# Patient Record
Sex: MALE | Race: WHITE | NOT HISPANIC OR LATINO | URBAN - METROPOLITAN AREA
[De-identification: names, ages, dates, MRNs, and addresses within clinical notes are randomized per-mention and may not be internally consistent; named-entity substitution may affect disease eponyms.]

---

## 2018-02-28 ENCOUNTER — CONSULT (OUTPATIENT)
Dept: CARDIOLOGY | Facility: CLINIC | Age: 43
End: 2018-02-28

## 2018-02-28 ENCOUNTER — HOSPITAL ENCOUNTER (OUTPATIENT)
Dept: CARDIOLOGY | Facility: HOSPITAL | Age: 43
Discharge: HOME | End: 2018-02-28
Attending: INTERNAL MEDICINE

## 2018-02-28 VITALS
HEIGHT: 70 IN | SYSTOLIC BLOOD PRESSURE: 120 MMHG | HEART RATE: 86 BPM | DIASTOLIC BLOOD PRESSURE: 78 MMHG | WEIGHT: 264.8 LBS | BODY MASS INDEX: 37.91 KG/M2

## 2018-02-28 DIAGNOSIS — I47.1 ATRIAL TACHYCARDIA (HCC): Primary | ICD-10-CM

## 2018-02-28 DIAGNOSIS — I10 ESSENTIAL HYPERTENSION: ICD-10-CM

## 2018-02-28 PROBLEM — I47.19 ATRIAL TACHYCARDIA: Status: ACTIVE | Noted: 2018-02-28

## 2018-02-28 PROCEDURE — 93000 ELECTROCARDIOGRAM COMPLETE: CPT | Performed by: INTERNAL MEDICINE

## 2018-02-28 PROCEDURE — 99203 OFFICE O/P NEW LOW 30 MIN: CPT | Performed by: INTERNAL MEDICINE

## 2018-02-28 RX ORDER — TESTOSTERONE 16.2 MG/G
GEL TRANSDERMAL
Refills: 0 | COMMUNITY
Start: 2018-01-29 | End: 2019-07-16

## 2018-02-28 RX ORDER — TADALAFIL 20 MG
TABLET ORAL
Refills: 5 | COMMUNITY
Start: 2018-01-19

## 2018-02-28 RX ORDER — LISINOPRIL 10 MG/1
TABLET ORAL
Refills: 1 | COMMUNITY
Start: 2018-02-14

## 2018-02-28 NOTE — PROGRESS NOTES
Electrophysiology Consult     Ramesh Boston  1975  [phone]      18    DATE OF ADMISSION: (Not on file)  [facility] CARDIOLOGY    Lopez Catalan MD  [address] [place]  / [place] [address]    Chief Complaint   Patient presents with   • Establish Care     Discuss Pacemaker removal      Problem List:  1. Atrial tachycardia:   A. Diagnosed at age 11   B. EPS x 6 with reported ablation x 4   C. EP study in  for ablation - inadvertent ablation of AVN requiring PPM implant with return of instrinsic rhythm within a few years   D. Recurrence of atrial tachycardia with ablation around  with no recurrence since   E. PPM near end of life in  - chose not to replace battery at that time as patient had not used pacemaker for years  2. Essential hypertension    History of Present Illness:   Patient is a 42 year old  male who presents to our office to re-establish care and discuss possibility of removing his pacemaker that he had implanted in .  He has a history of atrial tachycardia for which he has undergone about 6 EP studies and 4 ablations.  He states his ablation in  inadvertenly ablated his AV node and he required a pacemaker for a few years.  He states he regained his intrinsic rhythm back within a few years.  He states the battery  back in  and was decided not to replace the battery as he had not been using it.  He is wondering whether this can be removed and whether he can ever get an MRI.  Since his last ablation, he feels very well with no recurrence of his atrial tachycardia.  He has no other medical problems other than hypertension which is well controlled on lisinopril.  He denies any c/o CP, SOB, dizziness, LH, or syncope.  He denies any recent hospitalizations or ER visits.  He is a non-smoker, no ETOH, drinks a few cups of coffee daily.  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     Allergies   Allergen Reactions   •  Penicillins Rash        Cannot display prior to admission medications because the patient has not been admitted in this contact.            Current Outpatient Prescriptions:   •  esomeprazole (nexIUM) 20 MG capsule, Take 20 mg by mouth Every Morning Before Breakfast., Disp: , Rfl:   •  Prenatal Vit-Fe Fumarate-FA (M-VIT PO), Take  by mouth Daily., Disp: , Rfl:   •  ANDROGEL PUMP 20.25 MG/ACT (1.62%) gel, USE 2 PUMPS ONCE DAILY, Disp: , Rfl: 0  •  CIALIS 20 MG tablet, USE DAILY AS NEEDED, Disp: , Rfl: 5  •  lisinopril (PRINIVIL,ZESTRIL) 10 MG tablet, TAKE 1 TABLET BY MOUTH DAILY AS DIRECTED., Disp: , Rfl: 1    Social History     Social History   • Marital status: Unknown     Social History Main Topics   • Smoking status: Never Smoker   • Smokeless tobacco: Never Used   • Alcohol use No   • Drug use: No   • Sexual activity: Defer       Family History   Problem Relation Age of Onset   • No Known Problems Mother    • No Known Problems Father    • No Known Problems Sister        REVIEW OF SYSTEMS:   CONST:  No weight loss, fever, chills, weakness or fatigue.   HEENT:  No visual loss, blurred vision, double vision, yellow sclerae.                   No hearing loss, congestion, sore throat.   SKIN:      No rashes, urticaria, ulcers, sores.     RESP:     No shortness of breath, hemoptysis, cough, sputum.   GI:           No anorexia, nausea, vomiting, diarrhea. No abdominal pain, melena.   :         No burning on urination, hematuria or increased frequency.  ENDO:    No diaphoresis, cold or heat intolerance. No polyuria or polydipsia.   NEURO:  No headache, dizziness, syncope, paralysis, ataxia, or parasthesias.                  No change in bowel or bladder control. No history of CVA/TIA  MUSC:    No muscle, back pain, joint pain or stiffness.   HEME:    No anemia, bleeding, bruising. No history of DVT/PE.  PSYCH:  No history of depression, anxiety    Vitals:    02/28/18 1044   BP: 120/78   BP Location: Right arm  "  Patient Position: Sitting   Pulse: 86   Weight: 120 kg (264 lb 12.8 oz)   Height: 177.8 cm (70\")                 Physical Exam:  GEN: Well nourished, Well- developed  No acute distress  HEENT: Normocephalic, Atraumatic, PERRLA, moist mucous membranes  NECK: supple, NO JVD, no thyromegaly, no lymphadenopathy  CARD: S1S2, RRR no murmur, gallop, rub  LUNGS: Clear to auscultation, normal respiratory effort  ABDOMEN: Soft, nontender, normal bowel sounds  EXTREMITIES:No gross deformities,  No clubbing, cyanosis, or edema  SKIN: Warm, dry  NEURO: No focal deficits  PSYCHIATRIC: Normal affect and mood        ECG 12 Lead  Date/Time: 2/28/2018 11:15 AM  Performed by: JACQUELIN LACEY  Authorized by: JACQUELIN LACEY   Comparison: not compared with previous ECG   Previous ECG: no previous ECG available  Rhythm: sinus rhythm  BPM: 86                ICD-10-CM ICD-9-CM   1. Atrial tachycardia I47.1 427.89   2. Essential hypertension I10 401.9       Assessment and Plan:   1. Atrial tachycardia:  - S/p multiple EP studies and ablations, necessitated pacemaker implant in 1995, has not used since around 1998 with KRISTI around 2004  - Will obtain echocardiogram to assess EF and tricuspid valve, otherwise, no other changes at this time.  No indication or need for pacemaker removal, discussed this with patient and he verbalizes understanding.  He is aware he is able to get an MRI anytime if he needs.    2. Essential hypertension:  - Well controlled on current regimen  - Continue diet, weight loss and exercise    RTC 12 M    I, Jacquelin Lacey MD, personally performed the services described in this documentation as scribed by the above named individual in my presence, and it is both accurate and complete.  2/28/2018  11:50 AM  Scribed for Jacquelin Lacey MD by Sandy Chavez, APRN. 2/28/2018  11:33 AM  "

## 2019-07-15 NOTE — PROGRESS NOTES
[facility] at [facility]   OFFICE NOTE      Ramesh Boston  1975  PCP: Randi Hong MD    SUBJECTIVE:   Ramesh Boston is a 43 y.o. male seen for a follow up visit regarding the following:     CC:Atrial Tachycardia     HPI:   Mr. Boston is a 43-year-old gentleman who presents today for follow-up regarding history of SVT, atrial tachycardia, remote pacemaker placement for intermittent heart block.  Mr. Boston at age 11 had episodes of SVT with multiple ablation procedures.  In 1995 he had ablation with inadvertent ablation of AV node leading to heart block which required a pacemaker plantation.  However the AV node function recovered he no further required pacing in 2004 when his device had reached KRISTI it was elected not to replace the pacemaker if he had no further events of heart block.  In addition he had a repeat EP study over 20 years ago with successful ablation of SVT.  He has had no recurrent episodes of tachypalpitations.  He denies any dizziness near syncope or syncope.  He denies any chest pain or chest tightness suggesting angina pectoris.  He denies any heart failure symptoms.  He states that he takes his Zestril and his blood pressure remains controlled.  He checks his cholesterol with his family physician.  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  Because of his cardiac history he went to establish care with her office and continue following up with Dr. Lacey.    Cardiac PMH: (Old records have been reviewed and summarized below)  1. Atrial tachycardia:              A. Diagnosed at age 11              B. EPS x 6 with reported ablation x 4              C. EP study in 1995 for ablation - inadvertent ablation of AVN requiring PPM implant with return of instrinsic rhythm within a few years              D. Recurrence of atrial tachycardia with ablation around 2000 with no recurrence since              E. PPM near end of life in  2004 - chose not to replace battery at that time as patient had not used pacemaker for years  2. Essential hypertension  3. Mild Obesity.         Past Medical History, Past Surgical History, Family history, Social History, and Medications were all reviewed with the patient today and updated as necessary.       Current Outpatient Medications:   •  Cetirizine HCl (ZYRTEC PO), Take 1 tablet by mouth Daily., Disp: , Rfl:   •  CIALIS 20 MG tablet, USE DAILY AS NEEDED, Disp: , Rfl: 5  •  esomeprazole (nexIUM) 20 MG capsule, Take 20 mg by mouth Every Morning Before Breakfast., Disp: , Rfl:   •  lisinopril (PRINIVIL,ZESTRIL) 10 MG tablet, TAKE 1 TABLET BY MOUTH DAILY AS DIRECTED., Disp: , Rfl: 1  •  Montelukast Sodium (SINGULAIR PO), Take 1 tablet by mouth Daily., Disp: , Rfl:       Allergies   Allergen Reactions   • Penicillins Rash     Patient Active Problem List   Diagnosis   • Essential hypertension   • Atrial tachycardia (CMS/HCC)     Past Medical History:   Diagnosis Date   • Abnormal heart rhythm    • H/O cardiovascular stress test    • H/O echocardiogram    • Hypertension      Past Surgical History:   Procedure Laterality Date   • ABLATION OF DYSRHYTHMIC FOCUS     • EP STUDY     • INSERT / REPLACE / REMOVE PACEMAKER       Family History   Problem Relation Age of Onset   • No Known Problems Mother    • No Known Problems Father    • No Known Problems Sister      Social History     Tobacco Use   • Smoking status: Never Smoker   • Smokeless tobacco: Never Used   Substance Use Topics   • Alcohol use: No       ROS:  Review of Symptoms:  General: no recent weight loss/gain, weakness or fatigue  Skin: no rashes, lumps, or other skin changes  HEENT: no dizziness, lightheadedness, or vision changes  Respiratory: no cough or hemoptysis  Cardiovascular: no palpitations, and tachycardia.   Gastrointestinal: no black/tarry stools or diarrhea. + GERD.   Urinary: no change in frequency or urgency. Hx of ED.   Peripheral Vascular:  "no claudication or leg cramps  Musculoskeletal: + OA  Psychiatric: no depression or excessive stress  Neurological: no sensory or motor loss, no syncope  Hematologic: no anemia, easy bruising or bleeding  Endocrine: no thyroid problems, nor heat or cold intolerance    PHYSICAL EXAM:    /80 (BP Location: Left arm, Patient Position: Sitting)   Pulse 85   Ht 175.3 cm (69\")   Wt 117 kg (258 lb)   SpO2 98%   BMI 38.10 kg/m²        Wt Readings from Last 5 Encounters:   07/16/19 117 kg (258 lb)   02/28/18 120 kg (264 lb 12.8 oz)       BP Readings from Last 5 Encounters:   07/16/19 130/80   02/28/18 120/78       General appearance - Alert, well appearing, and in no distress   Mental status - Affect appropriate to mood.  Eyes - Sclerae anicteric,  ENMT - Hearing grossly normal bilaterally, Dental hygiene good.  Neck - Carotids upstroke normal bilaterally, no bruits, no JVD.  Resp - Clear to auscultation, no wheezes, rales or rhonchi, symmetric air entry.  Heart - Normal rate, regular rhythm, normal S1, S2, no murmurs, rubs, clicks or gallops.  GI - Soft, nontender, nondistended, no masses or organomegaly.  Neurological - Grossly intact - normal speech, no focal findings  Musculoskeletal - No joint tenderness, deformity or swelling, no muscular tenderness noted.  Extremities - Peripheral pulses normal, no pedal edema, no clubbing or cyanosis.  Skin - Normal coloration and turgor.  Psych -  oriented to person, place, and time.    Medical problems and test results were reviewed with the patient today.     No results found for this or any previous visit (from the past 672 hour(s)).      EKG: (EKG has been independently visualized by me and summarized below)    ECG 12 Lead  Date/Time: 7/16/2019 11:48 AM  Performed by: Moses Hayward PA  Authorized by: Moses Hayward PA   Comparison: compared with previous ECG from 2/28/2018  Similar to previous ECG  Rhythm: sinus rhythm  Rate: normal  Conduction: conduction " normal  ST Segments: ST segments normal  T Waves: T waves normal  QRS axis: normal    Clinical impression: normal ECG            ASSESSMENT   1. Atrial Tachycardia: Remote EPS/RFA. No recurrent palpitations, dizziness, or syncope.   2. HTN:  Controlled on Zestril   3. Remote PPM, The patient no longer requires pacing.  No history of near syncope or syncope.      PLAN  · Patient continues to do well with no episodes of palpitations or recurrent SVT.  He said no recurrent episodes dizzy near syncope or syncope or doctor-patient recurrent heart block.  He is no longer requiring use of a pacemaker.  · Continue to focus on risk factor medication including strict control blood pressure, diet exercise weight loss.  · Echocardiogram to reveiw EF and GXT stress test for history of arrythmias, CDL clearance.   · Return to follow-up after [facility] 1 year or sooner as needed.    7/16/2019  10:38 AM    Will Yesy ANDRADE

## 2019-07-16 ENCOUNTER — OFFICE VISIT (OUTPATIENT)
Dept: CARDIOLOGY | Facility: CLINIC | Age: 44
End: 2019-07-16

## 2019-07-16 VITALS
WEIGHT: 258 LBS | HEART RATE: 85 BPM | HEIGHT: 69 IN | OXYGEN SATURATION: 98 % | SYSTOLIC BLOOD PRESSURE: 130 MMHG | DIASTOLIC BLOOD PRESSURE: 80 MMHG | BODY MASS INDEX: 38.21 KG/M2

## 2019-07-16 DIAGNOSIS — I47.1 ATRIAL TACHYCARDIA (HCC): Primary | ICD-10-CM

## 2019-07-16 DIAGNOSIS — I10 ESSENTIAL HYPERTENSION: ICD-10-CM

## 2019-07-16 PROCEDURE — 93000 ELECTROCARDIOGRAM COMPLETE: CPT | Performed by: PHYSICIAN ASSISTANT

## 2019-07-16 PROCEDURE — 99213 OFFICE O/P EST LOW 20 MIN: CPT | Performed by: PHYSICIAN ASSISTANT

## 2019-07-17 ENCOUNTER — TELEPHONE (OUTPATIENT)
Dept: CARDIOLOGY | Facility: CLINIC | Age: 44
End: 2019-07-17

## 2020-08-16 ENCOUNTER — HOSPITAL ENCOUNTER (EMERGENCY)
Facility: HOSPITAL | Age: 45
Discharge: HOME | End: 2020-08-16
Attending: EMERGENCY MEDICINE | Admitting: EMERGENCY MEDICINE

## 2020-08-16 ENCOUNTER — APPOINTMENT (OUTPATIENT)
Dept: CT IMAGING | Facility: HOSPITAL | Age: 45
End: 2020-08-16

## 2020-08-16 VITALS
SYSTOLIC BLOOD PRESSURE: 114 MMHG | DIASTOLIC BLOOD PRESSURE: 76 MMHG | RESPIRATION RATE: 16 BRPM | TEMPERATURE: 97.8 F | HEIGHT: 69 IN | BODY MASS INDEX: 36.26 KG/M2 | HEART RATE: 75 BPM | WEIGHT: 244.8 LBS | OXYGEN SATURATION: 95 %

## 2020-08-16 DIAGNOSIS — N20.0 KIDNEY STONE: Primary | ICD-10-CM

## 2020-08-16 LAB
ALBUMIN SERPL-MCNC: 4.4 G/DL (ref 3.5–5.2)
ALBUMIN/GLOB SERPL: 1.4 G/DL
ALP SERPL-CCNC: 50 U/L (ref 39–117)
ALT SERPL W P-5'-P-CCNC: 33 U/L (ref 1–41)
ANION GAP SERPL CALCULATED.3IONS-SCNC: 9.5 MMOL/L (ref 5–15)
AST SERPL-CCNC: 22 U/L (ref 1–40)
BACTERIA UR QL AUTO: ABNORMAL /HPF
BASOPHILS # BLD AUTO: 0.06 10*3/MM3 (ref 0–0.2)
BASOPHILS NFR BLD AUTO: 0.6 % (ref 0–1.5)
BILIRUB SERPL-MCNC: 0.4 MG/DL (ref 0–1.2)
BILIRUB UR QL STRIP: NEGATIVE
BUN SERPL-MCNC: 24 MG/DL (ref 6–20)
BUN/CREAT SERPL: 18.2 (ref 7–25)
CALCIUM SPEC-SCNC: 9.4 MG/DL (ref 8.6–10.5)
CHLORIDE SERPL-SCNC: 102 MMOL/L (ref 98–107)
CLARITY UR: CLEAR
CO2 SERPL-SCNC: 27.5 MMOL/L (ref 22–29)
COLOR UR: YELLOW
CREAT SERPL-MCNC: 1.32 MG/DL (ref 0.76–1.27)
DEPRECATED RDW RBC AUTO: 42.1 FL (ref 37–54)
EOSINOPHIL # BLD AUTO: 0.23 10*3/MM3 (ref 0–0.4)
EOSINOPHIL NFR BLD AUTO: 2.2 % (ref 0.3–6.2)
ERYTHROCYTE [DISTWIDTH] IN BLOOD BY AUTOMATED COUNT: 13 % (ref 12.3–15.4)
GFR SERPL CREATININE-BSD FRML MDRD: 58 ML/MIN/1.73
GLOBULIN UR ELPH-MCNC: 3.1 GM/DL
GLUCOSE SERPL-MCNC: 118 MG/DL (ref 65–99)
GLUCOSE UR STRIP-MCNC: NEGATIVE MG/DL
HCT VFR BLD AUTO: 54.9 % (ref 37.5–51)
HGB BLD-MCNC: 18.8 G/DL (ref 13–17.7)
HGB UR QL STRIP.AUTO: ABNORMAL
HOLD SPECIMEN: NORMAL
HOLD SPECIMEN: NORMAL
HYALINE CASTS UR QL AUTO: ABNORMAL /LPF
IMM GRANULOCYTES # BLD AUTO: 0.06 10*3/MM3 (ref 0–0.05)
IMM GRANULOCYTES NFR BLD AUTO: 0.6 % (ref 0–0.5)
KETONES UR QL STRIP: NEGATIVE
LEUKOCYTE ESTERASE UR QL STRIP.AUTO: NEGATIVE
LIPASE SERPL-CCNC: 38 U/L (ref 13–60)
LYMPHOCYTES # BLD AUTO: 4.32 10*3/MM3 (ref 0.7–3.1)
LYMPHOCYTES NFR BLD AUTO: 41.7 % (ref 19.6–45.3)
MCH RBC QN AUTO: 30.6 PG (ref 26.6–33)
MCHC RBC AUTO-ENTMCNC: 34.2 G/DL (ref 31.5–35.7)
MCV RBC AUTO: 89.4 FL (ref 79–97)
MONOCYTES # BLD AUTO: 0.65 10*3/MM3 (ref 0.1–0.9)
MONOCYTES NFR BLD AUTO: 6.3 % (ref 5–12)
NEUTROPHILS NFR BLD AUTO: 48.6 % (ref 42.7–76)
NEUTROPHILS NFR BLD AUTO: 5.04 10*3/MM3 (ref 1.7–7)
NITRITE UR QL STRIP: NEGATIVE
NRBC BLD AUTO-RTO: 0 /100 WBC (ref 0–0.2)
PH UR STRIP.AUTO: <=5 [PH] (ref 5–8)
PLATELET # BLD AUTO: 227 10*3/MM3 (ref 140–450)
PMV BLD AUTO: 10 FL (ref 6–12)
POTASSIUM SERPL-SCNC: 3.8 MMOL/L (ref 3.5–5.2)
PROT SERPL-MCNC: 7.5 G/DL (ref 6–8.5)
PROT UR QL STRIP: NEGATIVE
RBC # BLD AUTO: 6.14 10*6/MM3 (ref 4.14–5.8)
RBC # UR: ABNORMAL /HPF
REF LAB TEST METHOD: ABNORMAL
SODIUM SERPL-SCNC: 139 MMOL/L (ref 136–145)
SP GR UR STRIP: 1.02 (ref 1–1.03)
SQUAMOUS #/AREA URNS HPF: ABNORMAL /HPF
UROBILINOGEN UR QL STRIP: ABNORMAL
WBC # BLD AUTO: 10.36 10*3/MM3 (ref 3.4–10.8)
WBC UR QL AUTO: ABNORMAL /HPF
WHOLE BLOOD HOLD SPECIMEN: NORMAL
WHOLE BLOOD HOLD SPECIMEN: NORMAL
YEAST URNS QL MICRO: ABNORMAL /HPF

## 2020-08-16 PROCEDURE — 96376 TX/PRO/DX INJ SAME DRUG ADON: CPT

## 2020-08-16 PROCEDURE — 96374 THER/PROPH/DIAG INJ IV PUSH: CPT

## 2020-08-16 PROCEDURE — 74176 CT ABD & PELVIS W/O CONTRAST: CPT

## 2020-08-16 PROCEDURE — 99283 EMERGENCY DEPT VISIT LOW MDM: CPT

## 2020-08-16 PROCEDURE — 25010000002 MORPHINE PER 10 MG: Performed by: EMERGENCY MEDICINE

## 2020-08-16 PROCEDURE — 96375 TX/PRO/DX INJ NEW DRUG ADDON: CPT

## 2020-08-16 PROCEDURE — 25010000002 KETOROLAC TROMETHAMINE PER 15 MG: Performed by: EMERGENCY MEDICINE

## 2020-08-16 PROCEDURE — 85025 COMPLETE CBC W/AUTO DIFF WBC: CPT | Performed by: EMERGENCY MEDICINE

## 2020-08-16 PROCEDURE — 80053 COMPREHEN METABOLIC PANEL: CPT | Performed by: EMERGENCY MEDICINE

## 2020-08-16 PROCEDURE — 81001 URINALYSIS AUTO W/SCOPE: CPT | Performed by: EMERGENCY MEDICINE

## 2020-08-16 PROCEDURE — 25010000002 ONDANSETRON PER 1 MG: Performed by: EMERGENCY MEDICINE

## 2020-08-16 PROCEDURE — 83690 ASSAY OF LIPASE: CPT | Performed by: EMERGENCY MEDICINE

## 2020-08-16 RX ORDER — ONDANSETRON 4 MG/1
4 TABLET, ORALLY DISINTEGRATING ORAL 4 TIMES DAILY PRN
Qty: 20 TABLET | Refills: 0 | Status: SHIPPED | OUTPATIENT
Start: 2020-08-16 | End: 2022-01-12

## 2020-08-16 RX ORDER — KETOROLAC TROMETHAMINE 30 MG/ML
10 INJECTION, SOLUTION INTRAMUSCULAR; INTRAVENOUS ONCE
Status: COMPLETED | OUTPATIENT
Start: 2020-08-16 | End: 2020-08-16

## 2020-08-16 RX ORDER — HYDROCODONE BITARTRATE AND ACETAMINOPHEN 5; 325 MG/1; MG/1
1 TABLET ORAL EVERY 6 HOURS PRN
Qty: 10 TABLET | Refills: 0 | Status: SHIPPED | OUTPATIENT
Start: 2020-08-16 | End: 2020-12-10

## 2020-08-16 RX ORDER — MORPHINE SULFATE 4 MG/ML
4 INJECTION, SOLUTION INTRAMUSCULAR; INTRAVENOUS ONCE
Status: COMPLETED | OUTPATIENT
Start: 2020-08-16 | End: 2020-08-16

## 2020-08-16 RX ORDER — ONDANSETRON 2 MG/ML
4 INJECTION INTRAMUSCULAR; INTRAVENOUS ONCE
Status: COMPLETED | OUTPATIENT
Start: 2020-08-16 | End: 2020-08-16

## 2020-08-16 RX ORDER — TAMSULOSIN HYDROCHLORIDE 0.4 MG/1
1 CAPSULE ORAL DAILY
Qty: 30 CAPSULE | Refills: 0 | Status: SHIPPED | OUTPATIENT
Start: 2020-08-16 | End: 2020-12-10

## 2020-08-16 RX ADMIN — KETOROLAC TROMETHAMINE 10 MG: 30 INJECTION, SOLUTION INTRAMUSCULAR at 06:14

## 2020-08-16 RX ADMIN — MORPHINE SULFATE 4 MG: 4 INJECTION, SOLUTION INTRAMUSCULAR; INTRAVENOUS at 06:14

## 2020-08-16 RX ADMIN — SODIUM CHLORIDE 1000 ML: 9 INJECTION, SOLUTION INTRAVENOUS at 06:11

## 2020-08-16 RX ADMIN — LIDOCAINE HYDROCHLORIDE 150 MG: 10 INJECTION, SOLUTION INFILTRATION; PERINEURAL at 07:56

## 2020-08-16 RX ADMIN — ONDANSETRON 4 MG: 2 INJECTION INTRAMUSCULAR; INTRAVENOUS at 06:11

## 2020-08-16 RX ADMIN — KETOROLAC TROMETHAMINE 10 MG: 30 INJECTION, SOLUTION INTRAMUSCULAR; INTRAVENOUS at 07:28

## 2020-08-16 RX ADMIN — MORPHINE SULFATE 4 MG: 4 INJECTION, SOLUTION INTRAMUSCULAR; INTRAVENOUS at 07:28

## 2020-08-16 NOTE — ED PROVIDER NOTES
TRIAGE CHIEF COMPLAINT:     Nursing and triage notes reviewed    Chief Complaint   Patient presents with   • Flank Pain      HPI: Ramesh Boston is a 44 y.o. male who presents to the emergency department complaining of a 1 hour history of right-sided flank pain.  Patient describes a sharp stabbing pain.  He has had some nausea but denies vomiting.  He denies dysuria or hematuria.  He denies diarrhea.  No fevers or chills.  No chest pain or shortness of breath.  Patient denies ever having similar symptoms in the past.  He states he was told before that he had a stone in 1 of his kidneys but is never had any symptoms of passing a stone previously.    REVIEW OF SYSTEMS: All other systems reviewed and are negative     PAST MEDICAL HISTORY:   Past Medical History:   Diagnosis Date   • Abnormal heart rhythm    • H/O cardiovascular stress test    • H/O echocardiogram    • Hypertension         FAMILY HISTORY:   Family History   Problem Relation Age of Onset   • No Known Problems Mother    • No Known Problems Father    • No Known Problems Sister         SOCIAL HISTORY:   Social History     Socioeconomic History   • Marital status:      Spouse name: Not on file   • Number of children: Not on file   • Years of education: Not on file   • Highest education level: Not on file   Tobacco Use   • Smoking status: Never Smoker   • Smokeless tobacco: Never Used   Substance and Sexual Activity   • Alcohol use: No   • Drug use: No   • Sexual activity: Defer        SURGICAL HISTORY:   Past Surgical History:   Procedure Laterality Date   • ABLATION OF DYSRHYTHMIC FOCUS     • EP STUDY     • INSERT / REPLACE / REMOVE PACEMAKER          CURRENT MEDICATIONS:      Medication List      ASK your doctor about these medications    Cialis 20 MG tablet  Generic drug:  tadalafil     esomeprazole 20 MG capsule  Commonly known as:  nexIUM     lisinopril 10 MG tablet  Commonly known as:  PRINIVIL,ZESTRIL     SINGULAIR PO     ZYRTEC PO            ALLERGIES: Penicillins     PHYSICAL EXAM:   VITAL SIGNS:   Vitals:    08/16/20 0541   BP: 126/82   Pulse: 74   Resp: 18   Temp: 97.8 °F (36.6 °C)   SpO2: 96%      CONSTITUTIONAL: Awake, oriented, appears non-toxic   HENT: Atraumatic, normocephalic, oral mucosa pink and moist, airway patent. Nares patent without drainage. External ears normal.   EYES: Conjunctiva clear   NECK: Trachea midline, non-tender, supple   CARDIOVASCULAR: Normal heart rate, Normal rhythm, No murmurs, rubs, gallops   PULMONARY/CHEST: Clear to auscultation, no rhonchi, wheezes, or rales. Symmetrical breath sounds.  ABDOMINAL: Non-distended, soft, mild right flank tenderness- no rebound or guarding.  NEUROLOGIC: Non-focal, moving all four extremities, no gross sensory or motor deficits.   EXTREMITIES: No clubbing, cyanosis, or edema   SKIN: Warm, Dry, No erythema, No rash     ED COURSE / MEDICAL DECISION MAKING:   Ramesh Boston is a 44 y.o. male who presents to the emergency department for evaluation of right-sided flank pain.  Patient appears mildly uncomfortable on arrival in the emergency department but vital signs are stable and patient appears nontoxic.  Examination does reveal tenderness in the right flank.  Exam otherwise is largely unremarkable.  Will obtain laboratory tests, urinalysis, imaging for further evaluation of patient's symptoms.    Laboratory tests reveal a creatinine of 1.3.  Labs otherwise largely unremarkable aside from blood in patient's urine.  CT scan per my interpretation reveals a right-sided distal ureteral stone.  Will await official radiology interpretation.    DECISION TO DISCHARGE/ADMIT: see ED care timeline     FINAL IMPRESSION:   1 --kidney stone  2 --   3 --     Electronically signed by: Lois Sanchez MD, 8/16/2020 06:04       Lois Sanchez MD  08/16/20 0646

## 2020-08-16 NOTE — ED NOTES
Patient was provided urinal, strainer, and specimen cup upon discharge.      Magda Peña RN  08/16/20 7038

## 2020-12-10 ENCOUNTER — OFFICE VISIT (OUTPATIENT)
Dept: CARDIOLOGY | Facility: CLINIC | Age: 45
End: 2020-12-10

## 2020-12-10 VITALS
WEIGHT: 249 LBS | HEART RATE: 72 BPM | DIASTOLIC BLOOD PRESSURE: 76 MMHG | SYSTOLIC BLOOD PRESSURE: 124 MMHG | OXYGEN SATURATION: 98 % | TEMPERATURE: 97.7 F | HEIGHT: 70 IN | BODY MASS INDEX: 35.65 KG/M2

## 2020-12-10 DIAGNOSIS — I47.1 PAROXYSMAL SVT (SUPRAVENTRICULAR TACHYCARDIA) (HCC): Primary | ICD-10-CM

## 2020-12-10 PROCEDURE — 93000 ELECTROCARDIOGRAM COMPLETE: CPT | Performed by: PHYSICIAN ASSISTANT

## 2020-12-10 PROCEDURE — 99213 OFFICE O/P EST LOW 20 MIN: CPT | Performed by: PHYSICIAN ASSISTANT

## 2020-12-10 RX ORDER — FAMOTIDINE 20 MG/1
20 TABLET, FILM COATED ORAL DAILY
COMMUNITY

## 2020-12-10 NOTE — PROGRESS NOTES
[facility] at [facility]   OFFICE NOTE      Ramesh Boston  1975  PCP: Radni Hong MD    SUBJECTIVE:   Ramesh Boston is a 45 y.o. male seen for a follow up visit regarding the following:     CC:AT    [facility]:   45-year-old gent returns today follow-up regarding atrial tachycardia previous history of pacemaker and hypertension.  The patient reports since last office visit things are going well for him.  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  He has been very active with his job is actually lost weight.  He reports his blood pressure well controlled.  He has had no difficulty with palpitations, dizziness, near-syncope, or syncope events.  He denies any heart failure symptoms.  He is wearing a CPAP and he states is improved his energy greatly.    Cardiac PMH: (Old records have been reviewed and summarized below)  1. Atrial tachycardia:              A. Diagnosed at age 11              B. EPS x 6 with reported ablation x 4              C. EP study in 1995 for ablation - inadvertent ablation of AVN requiring PPM implant with return of instrinsic rhythm within a few years              D. Recurrence of atrial tachycardia with ablation around 2000 with no recurrence since              E. PPM near end of life in 2004 - chose not to replace battery at that time as patient had not used pacemaker for years  2. Essential hypertension  3. Mild Obesity.   4. ALYSON:CPAP    Past Medical History, Past Surgical History, Family history, Social History, and Medications were all reviewed with the patient today and updated as necessary.       Current Outpatient Medications:   •  Cetirizine HCl (ZYRTEC PO), Take 1 tablet by mouth Daily., Disp: , Rfl:   •  CIALIS 20 MG tablet, USE DAILY AS NEEDED, Disp: , Rfl: 5  •  famotidine (PEPCID) 20 MG tablet, Take 20 mg by mouth Daily., Disp: , Rfl:   •  lisinopril (PRINIVIL,ZESTRIL) 10 MG tablet, TAKE 1 TABLET BY MOUTH DAILY AS DIRECTED., Disp: , Rfl: 1  •   "Montelukast Sodium (SINGULAIR PO), Take 1 tablet by mouth Daily., Disp: , Rfl:   •  ondansetron ODT (ZOFRAN-ODT) 4 MG disintegrating tablet, Place 1 tablet on the tongue 4 (Four) Times a Day As Needed for Nausea., Disp: 20 tablet, Rfl: 0  •  Unable to find, Med Name: CPAP QHS, Disp: , Rfl:       Allergies   Allergen Reactions   • Penicillins Anaphylaxis     Patient Active Problem List   Diagnosis   • Essential hypertension   • Atrial tachycardia (CMS/HCC)     Past Medical History:   Diagnosis Date   • Abnormal heart rhythm    • H/O cardiovascular stress test    • H/O echocardiogram    • Hypertension      Past Surgical History:   Procedure Laterality Date   • ABLATION OF DYSRHYTHMIC FOCUS     • EP STUDY     • INSERT / REPLACE / REMOVE PACEMAKER       Family History   Problem Relation Age of Onset   • No Known Problems Mother    • No Known Problems Father    • No Known Problems Sister      Social History     Tobacco Use   • Smoking status: Never Smoker   • Smokeless tobacco: Never Used   Substance Use Topics   • Alcohol use: No       ROS:  Review of Symptoms:  General: no recent weight loss/gain, weakness or fatigue  Skin: no rashes, lumps, or other skin changes  HEENT: no dizziness, lightheadedness, or vision changes  Respiratory: no cough or hemoptysis  Cardiovascular: no palpitations, and tachycardia  Gastrointestinal: no black/tarry stools or diarrhea  Urinary: no change in frequency or urgency  Peripheral Vascular: no claudication or leg cramps  Musculoskeletal: no muscle or joint pain/stiffness  Psychiatric: no depression or excessive stress  Neurological: no sensory or motor loss, no syncope  Hematologic: no anemia, easy bruising or bleeding  Endocrine: no thyroid problems, nor heat or cold intolerance    PHYSICAL EXAM:    /76 (BP Location: Right arm, Patient Position: Sitting)   Pulse 72   Temp 97.7 °F (36.5 °C)   Ht 177.8 cm (70\")   Wt 113 kg (249 lb)   SpO2 98%   BMI 35.73 kg/m²        Wt " Readings from Last 5 Encounters:   12/10/20 113 kg (249 lb)   08/16/20 111 kg (244 lb 12.8 oz)   07/16/19 117 kg (258 lb)   02/28/18 120 kg (264 lb 12.8 oz)       BP Readings from Last 5 Encounters:   12/10/20 124/76   08/16/20 114/76   07/16/19 130/80   02/28/18 120/78       General appearance - Alert, well appearing, and in no distress   Mental status - Affect appropriate to mood.  Eyes - Sclerae anicteric,  ENMT - Hearing grossly normal bilaterally, Dental hygiene good.  Neck - Carotids upstroke normal bilaterally, no bruits, no JVD.  Resp - Clear to auscultation, no wheezes, rales or rhonchi, symmetric air entry.  Heart - Normal rate, regular rhythm, normal S1, S2, no murmurs, rubs, clicks or gallops.  GI - Soft, nontender, nondistended, no masses or organomegaly.  Neurological - Grossly intact - normal speech, no focal findings  Musculoskeletal - No joint tenderness, deformity or swelling, no muscular tenderness noted.  Extremities - Peripheral pulses normal, no pedal edema, no clubbing or cyanosis.  Skin - Normal coloration and turgor.  Psych -  oriented to person, place, and time.    Medical problems and test results were reviewed with the patient today.     No results found for this or any previous visit (from the past 672 hour(s)).      EKG: (EKG has been independently visualized by me and summarized below)    ECG 12 Lead    Date/Time: 12/10/2020 3:30 PM  Performed by: Moses Hayward PA  Authorized by: Moses Hayward PA   Comparison: not compared with previous ECG   Rhythm: sinus rhythm  Rate: normal  Conduction: conduction normal  ST Segments: ST segments normal  T Waves: T waves normal  QRS axis: normal  Other: no other findings    Clinical impression: normal ECG              ASSESSMENT   1.  Atrial tachycardia: Remote EP study and RFA.  No recurrent events of palpitations.  2.  Hypertension: Controlled on Zestril therapy continue diet exercise weight loss  3.  Remote perm pacemaker for unknown  reasons.  Patient has had no recurrent symptoms of dizziness or near syncope   4.  Obstructive sleep apnea: Continue to wear CPAP    PLAN  · Overall stable course no recurrent palpitations or SVT.  · Continue to focus on risk factor medication clean diet exercise weight loss and keep blood pressure well controlled and cholesterol controlled  · Patient return for follow-up in 1 year or sooner if any change in symptoms.            12/10/2020  13:39 EST    Will Yesy ANDRADE

## 2022-01-12 ENCOUNTER — OFFICE VISIT (OUTPATIENT)
Dept: CARDIOLOGY | Facility: CLINIC | Age: 47
End: 2022-01-12

## 2022-01-12 VITALS
WEIGHT: 253 LBS | OXYGEN SATURATION: 98 % | HEART RATE: 82 BPM | DIASTOLIC BLOOD PRESSURE: 80 MMHG | BODY MASS INDEX: 36.22 KG/M2 | SYSTOLIC BLOOD PRESSURE: 124 MMHG | HEIGHT: 70 IN

## 2022-01-12 DIAGNOSIS — I10 ESSENTIAL HYPERTENSION: ICD-10-CM

## 2022-01-12 DIAGNOSIS — G47.33 OSA (OBSTRUCTIVE SLEEP APNEA): ICD-10-CM

## 2022-01-12 DIAGNOSIS — I47.1 ATRIAL TACHYCARDIA: Primary | ICD-10-CM

## 2022-01-12 PROCEDURE — 99213 OFFICE O/P EST LOW 20 MIN: CPT | Performed by: INTERNAL MEDICINE

## 2022-01-12 PROCEDURE — 93000 ELECTROCARDIOGRAM COMPLETE: CPT | Performed by: INTERNAL MEDICINE

## 2022-01-12 RX ORDER — DIPHENOXYLATE HYDROCHLORIDE AND ATROPINE SULFATE 2.5; .025 MG/1; MG/1
2 TABLET ORAL DAILY
COMMUNITY

## 2022-01-12 RX ORDER — DUPILUMAB 200 MG/1.14ML
1.14 INJECTION, SOLUTION SUBCUTANEOUS
COMMUNITY

## 2022-01-12 NOTE — PROGRESS NOTES
Ramesh Boston  1975  [phone]    01/12/2022    [facility] CARDIOLOGY     Referring Provider: No ref. provider found     Randi Hong MD  [address] [facility] [address]    Chief Complaint   Patient presents with   • supraventicular tachycardia       Problem List:   1. Atrial tachycardia:              A. Diagnosed at age 11              B. EPS x 6 with reported ablation x 4              C. EP study in 1995 for ablation - inadvertent ablation of AVN requiring PPM implant with return of  instrinsic rhythm within a few years              D. Recurrence of atrial tachycardia with ablation 2000              E. PPM near end of life in 2004 - chose not to replace battery at that time as patient had not used  pacemaker for years  2. Essential hypertension  3. Mild Obesity.   4. ALYSON:CPAP      Allergies  Allergies   Allergen Reactions   • Penicillins Anaphylaxis       Current Medications    Current Outpatient Medications:   •  Cetirizine HCl (ZYRTEC PO), Take 1 tablet by mouth Daily., Disp: , Rfl:   •  CIALIS 20 MG tablet, USE DAILY AS NEEDED, Disp: , Rfl: 5  •  Dupilumab (Dupixent) 200 MG/1.14ML solution prefilled syringe, 1.14 mL Every 14 (Fourteen) Days., Disp: , Rfl:   •  famotidine (PEPCID) 20 MG tablet, Take 20 mg by mouth Daily., Disp: , Rfl:   •  lisinopril (PRINIVIL,ZESTRIL) 10 MG tablet, TAKE 1 TABLET BY MOUTH DAILY AS DIRECTED., Disp: , Rfl: 1  •  Montelukast Sodium (SINGULAIR PO), Take 1 tablet by mouth Daily., Disp: , Rfl:   •  multivitamin (MULTI-VITAMIN PO), Take 2 tablets by mouth Daily. 2 gummies daily, Disp: , Rfl:   •  Unable to find, Med Name: CPAP QHS, Disp: , Rfl:     History of Present Illness     Pt presents for follow up of SVT/HTN/ALYSON. Since we last saw the pt, pt denies any palps, SOB, CP, LH, and dizziness. Denies any hospitalizations, ER visits, bleeding, or TIA/CVA symptoms. Overall feels well. BP well controlled at home. Uses CPAP daily     ROS:  General:   "Denies fatigue, weight gain or loss  Cardiovascular:  Denies CP, PND, syncope, near syncope, edema or palpitations.  Pulmonary:  Denies FOREMAN, cough, or wheezing      Vitals:    01/12/22 0955   BP: 124/80   BP Location: Left arm   Patient Position: Sitting   Pulse: 82   SpO2: 98%   Weight: 115 kg (253 lb)   Height: 177.8 cm (70\")     Body mass index is 36.3 kg/m².  PE:  General: NAD  Neck: no JVD, no carotid bruits, no TM  Heart RRR, NL S1, S2, S4 present, no rubs, murmurs  Lungs: CTA, no wheezes, rhonchi, or rales  Abd: soft, non-tender, NL BS  Ext: No musculoskeletal deformities, no edema, cyanosis, or clubbing  Psych: normal mood and affect    Diagnostic Data:        ECG 12 Lead    Date/Time: 1/12/2022 10:10 AM  Performed by: Anshu Lacey MD  Authorized by: Anshu Lacey MD   Comparison: compared with previous ECG from 12/10/2020  Similar to previous ECG  Rhythm: sinus rhythm  BPM: 80              1. Atrial tachycardia (HCC)    2. Essential hypertension    3. ALYSON (obstructive sleep apnea)          Plan:  1.  Atrial tachycardia: Remote EP study and RFA.  No recurrent events of palpitations.  2.  Hypertension: Controlled on Zestril therapy continue diet exercise weight loss  3.  Remote perm pacemaker for inadvertant CHB that resolved   4.  Obstructive sleep apnea: Continue to wear CPAP    RTC 12 M      "

## 2022-04-22 ENCOUNTER — TELEPHONE (OUTPATIENT)
Dept: CARDIOLOGY | Facility: CLINIC | Age: 47
End: 2022-04-22

## 2022-04-22 NOTE — TELEPHONE ENCOUNTER
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 I tried to call the patient back at 10am.  No answer.  I left message on patient's VM to call back.

## 2022-04-25 NOTE — TELEPHONE ENCOUNTER
I called and spoke with the patient. 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

## 2022-06-09 ENCOUNTER — TELEPHONE (OUTPATIENT)
Dept: CARDIOLOGY | Facility: CLINIC | Age: 47
End: 2022-06-09

## 2022-08-29 ENCOUNTER — TELEPHONE (OUTPATIENT)
Dept: CARDIOLOGY | Facility: CLINIC | Age: 47
End: 2022-08-29

## 2022-08-29 NOTE — TELEPHONE ENCOUNTER
Patient called and would like to know if there are side effects of testosterone injections that can cause him to have palpitations? He called his PCP and they said to call us. He has been having palpitations for a couple months and that is about the same time he started the injections.

## 2022-08-30 DIAGNOSIS — R00.2 PALPITATIONS: Primary | ICD-10-CM

## 2022-08-30 NOTE — TELEPHONE ENCOUNTER
Yes, that is likely the cause of his palpitations. We can get a ZioPatch 1 week to see what he is experiencing.

## 2023-06-15 ENCOUNTER — OFFICE VISIT (OUTPATIENT)
Dept: CARDIOLOGY | Facility: CLINIC | Age: 48
End: 2023-06-15
Payer: COMMERCIAL

## 2023-06-15 VITALS
DIASTOLIC BLOOD PRESSURE: 62 MMHG | WEIGHT: 270 LBS | HEART RATE: 83 BPM | SYSTOLIC BLOOD PRESSURE: 118 MMHG | HEIGHT: 69 IN | OXYGEN SATURATION: 97 % | BODY MASS INDEX: 39.99 KG/M2

## 2023-06-15 DIAGNOSIS — I47.1 ATRIAL TACHYCARDIA: Primary | ICD-10-CM

## 2023-06-15 DIAGNOSIS — I10 ESSENTIAL HYPERTENSION: ICD-10-CM

## 2023-06-15 PROCEDURE — 99213 OFFICE O/P EST LOW 20 MIN: CPT | Performed by: PHYSICIAN ASSISTANT

## 2023-06-15 PROCEDURE — 93000 ELECTROCARDIOGRAM COMPLETE: CPT | Performed by: PHYSICIAN ASSISTANT

## 2023-06-15 RX ORDER — POLYETHYLENE GLYCOL-3350 AND ELECTROLYTES 236; 6.74; 5.86; 2.97; 22.74 G/274.31G; G/274.31G; G/274.31G; G/274.31G; G/274.31G
POWDER, FOR SOLUTION ORAL
COMMUNITY
Start: 2023-03-30 | End: 2023-06-15

## 2023-06-15 RX ORDER — NEEDLES, DISPOSABLE 25GX5/8"
NEEDLE, DISPOSABLE MISCELLANEOUS
COMMUNITY
Start: 2023-05-08

## 2023-06-15 RX ORDER — SYRINGE, DISPOSABLE, 1 ML
SYRINGE, EMPTY DISPOSABLE MISCELLANEOUS SEE ADMIN INSTRUCTIONS
COMMUNITY
Start: 2023-05-08

## 2023-06-15 RX ORDER — TESTOSTERONE CYPIONATE 200 MG/ML
INJECTION, SOLUTION INTRAMUSCULAR
COMMUNITY

## 2023-06-15 RX ORDER — PAROXETINE HYDROCHLORIDE 20 MG/1
1 TABLET, FILM COATED ORAL DAILY
COMMUNITY
Start: 2023-05-19

## 2023-06-15 NOTE — PROGRESS NOTES
"Ramesh Boston  1975  [phone]        [facility] CARDIOLOGY [facility]IN [facility]         Randi Hong MD  [address] [facility] [address]    Chief Complaint   Patient presents with    Paroxysmal SVT (supraventricular tachycardia)       Problem List:   1. Atrial tachycardia:              A. Diagnosed at age 11              B. EPS x 6 with reported ablation x 4              C. EP study in 1995 for ablation - inadvertent ablation of AVN requiring PPM implant with return of  instrinsic rhythm within a few years              D. Recurrence of atrial tachycardia with ablation 2000              E. PPM near end of life in 2004 - chose not to replace battery at that time as patient had not used  pacemaker for years  2. Essential hypertension  3. Mild Obesity.   4. ALYSON:CPAP      Allergies  Allergies   Allergen Reactions    Penicillins Anaphylaxis       Current Medications    Current Outpatient Medications:     B-D HYPODERMIC NEEDLE 22GX1\" 22G X 1\" misc, , Disp: , Rfl:     BD Hypodermic Needle 18G X 1\" misc, , Disp: , Rfl:     BD Plastipak Syringe 3 ML misc, See Admin Instructions., Disp: , Rfl:     Cetirizine HCl (ZYRTEC PO), Take 1 tablet by mouth Daily., Disp: , Rfl:     Dupilumab (Dupixent) 200 MG/1.14ML solution prefilled syringe, 1.14 mL Every 14 (Fourteen) Days., Disp: , Rfl:     famotidine (PEPCID) 20 MG tablet, Take 1 tablet by mouth Daily., Disp: , Rfl:     lisinopril (PRINIVIL,ZESTRIL) 10 MG tablet, TAKE 1 TABLET BY MOUTH DAILY AS DIRECTED., Disp: , Rfl: 1    Montelukast Sodium (SINGULAIR PO), Take 1 tablet by mouth Daily., Disp: , Rfl:     multivitamin (THERAGRAN) tablet tablet, Take 2 tablets by mouth Daily. 2 gummies daily, Disp: , Rfl:     PARoxetine (PAXIL) 20 MG tablet, Take 1 tablet by mouth Daily., Disp: , Rfl:     Testosterone Cypionate (DEPOTESTOTERONE CYPIONATE) 200 MG/ML injection, INJECT 1 ML EVERY 2 WEEKS BY INTRAMUSCULAR ROUTE AS DIRECTED FOR 14 DAYS., Disp: , Rfl:     " "Unable to find, Med Name: CPAP QHS, Disp: , Rfl:     CIALIS 20 MG tablet, Take 5 mg by mouth Daily., Disp: , Rfl: 5    History of Present Illness     Pt presents for follow up of SVT/HTN/ALYSON. Since we last saw the pt, pt denies any palps, SOB, CP, LH, and dizziness. Denies any hospitalizations, ER visits, bleeding, or TIA/CVA symptoms. Overall feels well. BP well controlled at home. Uses CPAP daily . He is exercising with no symptoms.     ROS:  General:  Denies fatigue, weight gain or loss  Cardiovascular:  Denies CP, PND, syncope, near syncope, edema or palpitations.  Pulmonary:  Denies FOREMAN, cough, or wheezing      Vitals:    06/15/23 1036   BP: 118/62   BP Location: Right arm   Patient Position: Sitting   Cuff Size: Adult   Pulse: 83   SpO2: 97%   Weight: 122 kg (270 lb)   Height: 175.3 cm (69\")     Body mass index is 39.87 kg/m².  PE:  General: NAD  Neck: no JVD, no carotid bruits, no TM  Heart RRR, NL S1, S2, S4 present, no rubs, murmurs  Lungs: CTA, no wheezes, rhonchi, or rales  Abd: soft, non-tender, NL BS  Ext: No musculoskeletal deformities, no edema, cyanosis, or clubbing  Psych: normal mood and affect    Diagnostic Data:        ECG 12 Lead    Date/Time: 6/15/2023 10:59 AM  Performed by: Moses Hayward PA  Authorized by: Moses Hayward PA   Comparison: compared with previous ECG from 1/12/2022  Similar to previous ECG  Rhythm: sinus rhythm  Rate: normal  Conduction: conduction normal  ST Segments: ST segments normal  QRS axis: normal  Other: no other findings    Clinical impression: normal ECG        1. Atrial tachycardia    2. Essential hypertension          Plan:  1.  Atrial tachycardia: Remote EP study and RFA.  No recurrent events of palpitations.    2.  Hypertension: Controlled on Zestril therapy continue diet exercise weight loss    3.  Remote perm pacemaker for inadvertant CHB that resolved- No symptoms      4.  Obstructive sleep apnea: Continue to wear CPAP      Electronically signed by " SANDY Segura, 06/15/23, 8:15 AM EDT.

## 2024-01-29 ENCOUNTER — TELEPHONE (OUTPATIENT)
Dept: CARDIOLOGY | Facility: CLINIC | Age: 49
End: 2024-01-29
Payer: COMMERCIAL

## 2024-01-29 NOTE — TELEPHONE ENCOUNTER
Caller: Ramesh Boston     Relationship: SELF     Best call back number: [phone]    What is your medical concern? PT WOULD LIKE TO GET AN APPT TO COME IN FOR SOME PREVENTATIVE TESTING DUE TO BEING OVERWEIGHT AND WOULD LIKE TO LOOSE SOME WEIGHT BUT WOULD LIKE TO MAKE SURE HIS HEART IS OK PRIOR TO DOING THAT. PT ALSO STATES HE'S HAVING SOME SOB EASILY. PLEASE CALL BACK TO ADVISE, THANK YOU.

## 2024-02-26 ENCOUNTER — OFFICE VISIT (OUTPATIENT)
Dept: CARDIOLOGY | Facility: CLINIC | Age: 49
End: 2024-02-26
Payer: COMMERCIAL

## 2024-02-26 VITALS
BODY MASS INDEX: 38.51 KG/M2 | SYSTOLIC BLOOD PRESSURE: 148 MMHG | HEART RATE: 78 BPM | WEIGHT: 269 LBS | DIASTOLIC BLOOD PRESSURE: 84 MMHG | OXYGEN SATURATION: 96 % | HEIGHT: 70 IN

## 2024-02-26 DIAGNOSIS — R07.89 CHEST PAIN, ATYPICAL: ICD-10-CM

## 2024-02-26 DIAGNOSIS — I10 ESSENTIAL HYPERTENSION: Primary | ICD-10-CM

## 2024-02-26 DIAGNOSIS — R01.1 CARDIAC MURMUR: ICD-10-CM

## 2024-02-26 DIAGNOSIS — I47.19 ATRIAL TACHYCARDIA: ICD-10-CM

## 2024-02-26 PROCEDURE — 93000 ELECTROCARDIOGRAM COMPLETE: CPT | Performed by: PHYSICIAN ASSISTANT

## 2024-02-26 PROCEDURE — 99214 OFFICE O/P EST MOD 30 MIN: CPT | Performed by: PHYSICIAN ASSISTANT

## 2024-02-26 NOTE — PROGRESS NOTES
"Ramesh Boston  1975  [phone]        [facility] CARDIOLOGY         Hal, MD Randi  [address] [facility] [address]    Chief Complaint   Patient presents with    Atrial tachycardia    Paroxysmal SVT (supraventricular tachycardia)    Shortness of Breath     On exertion, desires to lose weight to alleviate symptoms       Problem List:   Atrial tachycardia  Diagnosed at age 11  Multiple EP studies reported ablation x 4 (database insufficiency (  EP study 1995 and a burden ablation AV node requiring permanent pacemaker plan with return to intrinsic rhythm a few days later recurrent A. tach 2000  Pacemaker end-of-life, patient not requiring further pacing therefore generator change deferred, declined  Hypertension: Controlled on Zestril  Mild obesity  Obstructive sleep apnea, CPAP  Depression/anxiety, Paxil therapy  GERD, Prilosec  Erectile dysfunction, Cialis        Allergies  Allergies   Allergen Reactions    Penicillins Anaphylaxis       Current Medications    Current Outpatient Medications:     B-D HYPODERMIC NEEDLE 22GX1\" 22G X 1\" misc, , Disp: , Rfl:     BD Hypodermic Needle 18G X 1\" misc, , Disp: , Rfl:     BD Plastipak Syringe 3 ML misc, See Admin Instructions., Disp: , Rfl:     Cetirizine HCl (ZYRTEC PO), Take 1 tablet by mouth Daily., Disp: , Rfl:     CIALIS 20 MG tablet, Take 5 mg by mouth Daily., Disp: , Rfl: 5    Dupilumab (Dupixent) 200 MG/1.14ML solution prefilled syringe, 1.14 mL Every 14 (Fourteen) Days., Disp: , Rfl:     famotidine (PEPCID) 20 MG tablet, Take 1 tablet by mouth Daily., Disp: , Rfl:     lisinopril (PRINIVIL,ZESTRIL) 10 MG tablet, TAKE 1 TABLET BY MOUTH DAILY AS DIRECTED., Disp: , Rfl: 1    Montelukast Sodium (SINGULAIR PO), Take 1 tablet by mouth Daily., Disp: , Rfl:     multivitamin (THERAGRAN) tablet tablet, Take 2 tablets by mouth Daily. 2 gummies daily, Disp: , Rfl:     PARoxetine (PAXIL) 20 MG tablet, Take 1 tablet by mouth Daily., Disp: , " "Rfl:     Testosterone Cypionate (DEPOTESTOTERONE CYPIONATE) 200 MG/ML injection, Every 28 (Twenty-Eight) Days., Disp: , Rfl:     Unable to find, Med Name: CPAP QHS, Disp: , Rfl:     History of Present Illness     Pleasant 48-year-old gentleman last seen by Dr. Lacey January 2022 returns today for follow-up regarding atrial tachycardia previous remote pacemaker placement and hypertension.  Patient reports to me is not any tachypalpitations.  However he states he has been more short of breath than usual.  He reports that he was walking through the mall with his granddaughter on his shoulders he can barely make it through the mall without having to stop because he was so short of breath some tightness in his chest.  This seems not normal for him he said normally he can walk a great distance without any symptoms.  This is a new finding in the past couple weeks.  He denies any heart failure signs like orthopnea or peripheral edema.  He does check his blood pressure pretty regularly he states this been controlled.  He is wearing his CPAP and this been life-changing and has improved his energy level.  ROS:  General:  Denies fatigue, weight gain or loss  Cardiovascular:  Denies CP, PND, syncope, near syncope, edema or palpitations.  Pulmonary:  Denies FOREMAN, cough, or wheezing      Vitals:    02/26/24 1057   BP: 148/84   BP Location: Right arm   Patient Position: Sitting   Pulse: 78   SpO2: 96%   Weight: 122 kg (269 lb)   Height: 177.8 cm (70\")       Body mass index is 38.6 kg/m².  PE:  General: NAD  Neck: no JVD, no carotid bruits, no TM  Heart RRR, NL S1, S2, S4 present, no rubs, 2/6 stock ejection murmur  Lungs: CTA, no wheezes, rhonchi, or rales  Abd: soft, non-tender, NL BS  Ext: No musculoskeletal deformities, no edema, cyanosis, or clubbing  Psych: normal mood and affect    Diagnostic Data:        ECG 12 Lead    Date/Time: 2/26/2024 11:32 AM  Performed by: Moses Hayward PA    Authorized by: Moses Hayward " SANDY RODRIGUEZ  Comparison: compared with previous ECG from 1/5/2023  Similar to previous ECG  Rhythm: sinus rhythm  Rate: normal  Conduction: conduction normal  ST Segments: ST segments normal  QRS axis: normal  Other: no other findings    Clinical impression: normal ECG          1. Essential hypertension    2. Atrial tachycardia    3. Chest pain, atypical            Plan:  1.  Atrial tachycardia: Remote EP study and RFA.  No recurrent events of palpitations.    2.  Hypertension: Controlled on Zestril therapy continue diet exercise weight loss    3.  Remote perm pacemaker for inadvertant CHB that resolved- No symptoms      4.  Obstructive sleep apnea: Continue to wear CPAP    5.  New symptoms of shortness of breath chest pain with exertion could be anginal equivalent.  He also has a cardiac murmur.  He was ordered a stress test and echo 4 years ago was unable to complete these.  He is agreeable to do this at this point.    GXT cardiac stress test due to chest pain abnormal EKG  Echocardiogram regarding shortness of breath cardiac murmur  Return follow-up or office to 3 months or sooner as needed.    Electronically signed by SANDY Segura, 02/26/24, 11:32 AM EST.

## 2024-04-08 ENCOUNTER — HOSPITAL ENCOUNTER (OUTPATIENT)
Dept: CARDIOLOGY | Facility: HOSPITAL | Age: 49
Discharge: HOME | End: 2024-04-08
Payer: COMMERCIAL

## 2024-04-08 VITALS
WEIGHT: 269 LBS | BODY MASS INDEX: 38.51 KG/M2 | HEIGHT: 70 IN | DIASTOLIC BLOOD PRESSURE: 83 MMHG | SYSTOLIC BLOOD PRESSURE: 115 MMHG

## 2024-04-08 VITALS
SYSTOLIC BLOOD PRESSURE: 111 MMHG | WEIGHT: 268.96 LBS | HEIGHT: 70 IN | OXYGEN SATURATION: 97 % | BODY MASS INDEX: 38.51 KG/M2 | HEART RATE: 78 BPM | DIASTOLIC BLOOD PRESSURE: 71 MMHG

## 2024-04-08 DIAGNOSIS — R07.89 CHEST PAIN, ATYPICAL: ICD-10-CM

## 2024-04-08 LAB
BH CV ECHO MEAS - AO MAX PG: 8.6 MMHG
BH CV ECHO MEAS - AO MEAN PG: 4.5 MMHG
BH CV ECHO MEAS - AO ROOT DIAM: 3.2 CM
BH CV ECHO MEAS - AO V2 MAX: 146.5 CM/SEC
BH CV ECHO MEAS - AO V2 VTI: 25.5 CM
BH CV ECHO MEAS - AVA(I,D): 2.42 CM2
BH CV ECHO MEAS - EDV(CUBED): 67.4 ML
BH CV ECHO MEAS - EDV(MOD-SP2): 117 ML
BH CV ECHO MEAS - EDV(MOD-SP4): 116 ML
BH CV ECHO MEAS - EF(MOD-BP): 55 %
BH CV ECHO MEAS - EF(MOD-SP2): 57.3 %
BH CV ECHO MEAS - EF(MOD-SP4): 50 %
BH CV ECHO MEAS - ESV(CUBED): 22 ML
BH CV ECHO MEAS - ESV(MOD-SP2): 50 ML
BH CV ECHO MEAS - ESV(MOD-SP4): 58 ML
BH CV ECHO MEAS - FS: 31.2 %
BH CV ECHO MEAS - IVS/LVPW: 1.04 CM
BH CV ECHO MEAS - IVSD: 1.06 CM
BH CV ECHO MEAS - LA DIMENSION: 3.4 CM
BH CV ECHO MEAS - LV DIASTOLIC VOL/BSA (35-75): 49 CM2
BH CV ECHO MEAS - LV MASS(C)D: 138 GRAMS
BH CV ECHO MEAS - LV MAX PG: 5.7 MMHG
BH CV ECHO MEAS - LV MEAN PG: 3 MMHG
BH CV ECHO MEAS - LV SYSTOLIC VOL/BSA (12-30): 24.5 CM2
BH CV ECHO MEAS - LV V1 MAX: 119 CM/SEC
BH CV ECHO MEAS - LV V1 VTI: 19.7 CM
BH CV ECHO MEAS - LVIDD: 4.1 CM
BH CV ECHO MEAS - LVIDS: 2.8 CM
BH CV ECHO MEAS - LVOT AREA: 3.1 CM2
BH CV ECHO MEAS - LVOT DIAM: 2 CM
BH CV ECHO MEAS - LVPWD: 1.02 CM
BH CV ECHO MEAS - MR MAX PG: 23.6 MMHG
BH CV ECHO MEAS - MR MAX VEL: 243 CM/SEC
BH CV ECHO MEAS - MV A MAX VEL: 64.5 CM/SEC
BH CV ECHO MEAS - MV DEC SLOPE: 368 CM/SEC2
BH CV ECHO MEAS - MV DEC TIME: 0.17 SEC
BH CV ECHO MEAS - MV E MAX VEL: 73.6 CM/SEC
BH CV ECHO MEAS - MV E/A: 1.14
BH CV ECHO MEAS - MV MAX PG: 2.26 MMHG
BH CV ECHO MEAS - MV MEAN PG: 1 MMHG
BH CV ECHO MEAS - MV P1/2T: 61.9 MSEC
BH CV ECHO MEAS - MV V2 VTI: 18.2 CM
BH CV ECHO MEAS - MVA(P1/2T): 3.6 CM2
BH CV ECHO MEAS - MVA(VTI): 3.4 CM2
BH CV ECHO MEAS - PA ACC SLOPE: 2275 CM/SEC2
BH CV ECHO MEAS - PA ACC TIME: 0.05 SEC
BH CV ECHO MEAS - PA V2 MAX: 163 CM/SEC
BH CV ECHO MEAS - SI(MOD-SP2): 28.3 ML/M2
BH CV ECHO MEAS - SI(MOD-SP4): 24.5 ML/M2
BH CV ECHO MEAS - SV(LVOT): 61.7 ML
BH CV ECHO MEAS - SV(MOD-SP2): 67 ML
BH CV ECHO MEAS - SV(MOD-SP4): 58 ML
BH CV ECHO MEAS - TAPSE (>1.6): 2.3 CM
BH CV XLRA - RV BASE: 3.4 CM
BH CV XLRA - RV LENGTH: 8.4 CM
BH CV XLRA - RV MID: 3 CM
LEFT ATRIUM VOLUME INDEX: 23.2 ML/M2

## 2024-04-08 PROCEDURE — 78452 HT MUSCLE IMAGE SPECT MULT: CPT | Performed by: INTERNAL MEDICINE

## 2024-04-08 PROCEDURE — 93018 CV STRESS TEST I&R ONLY: CPT | Performed by: INTERNAL MEDICINE

## 2024-04-08 PROCEDURE — A9500 TC99M SESTAMIBI: HCPCS | Performed by: PHYSICIAN ASSISTANT

## 2024-04-08 PROCEDURE — 0 TECHNETIUM SESTAMIBI: Performed by: PHYSICIAN ASSISTANT

## 2024-04-08 PROCEDURE — 93306 TTE W/DOPPLER COMPLETE: CPT | Performed by: INTERNAL MEDICINE

## 2024-04-08 PROCEDURE — 78452 HT MUSCLE IMAGE SPECT MULT: CPT

## 2024-04-08 PROCEDURE — 93017 CV STRESS TEST TRACING ONLY: CPT

## 2024-04-08 PROCEDURE — 93306 TTE W/DOPPLER COMPLETE: CPT

## 2024-04-08 RX ADMIN — TECHNETIUM TC 99M SESTAMIBI 1 DOSE: 1 INJECTION INTRAVENOUS at 11:10

## 2024-04-08 RX ADMIN — TECHNETIUM TC 99M SESTAMIBI 1 DOSE: 1 INJECTION INTRAVENOUS at 09:01

## 2024-04-09 LAB
BH CV REST NUCLEAR ISOTOPE DOSE: 9.3 MCI
BH CV STRESS BP STAGE 1: NORMAL
BH CV STRESS DURATION MIN STAGE 1: 3
BH CV STRESS DURATION MIN STAGE 2: 3
BH CV STRESS DURATION MIN STAGE 3: 3
BH CV STRESS DURATION SEC STAGE 1: 0
BH CV STRESS DURATION SEC STAGE 2: 0
BH CV STRESS DURATION SEC STAGE 3: 0
BH CV STRESS GRADE STAGE 1: 10
BH CV STRESS GRADE STAGE 2: 12
BH CV STRESS GRADE STAGE 3: 14
BH CV STRESS HR STAGE 1: 109
BH CV STRESS HR STAGE 2: 130
BH CV STRESS HR STAGE 3: 146
BH CV STRESS METS STAGE 1: 5
BH CV STRESS METS STAGE 2: 7.5
BH CV STRESS METS STAGE 3: 10
BH CV STRESS NUCLEAR ISOTOPE DOSE: 32.1 MCI
BH CV STRESS O2 STAGE 1: 96
BH CV STRESS O2 STAGE 2: 96
BH CV STRESS O2 STAGE 3: 95
BH CV STRESS PROTOCOL 1: NORMAL
BH CV STRESS RECOVERY BP: NORMAL MMHG
BH CV STRESS RECOVERY HR: 98 BPM
BH CV STRESS RECOVERY O2: 98 %
BH CV STRESS SPEED STAGE 1: 1.7
BH CV STRESS SPEED STAGE 2: 2.5
BH CV STRESS SPEED STAGE 3: 3.4
BH CV STRESS STAGE 1: 1
BH CV STRESS STAGE 2: 2
BH CV STRESS STAGE 3: 3
LV EF NUC BP: 68 %
MAXIMAL PREDICTED HEART RATE: 170 BPM
PERCENT MAX PREDICTED HR: 85.88 %
STRESS BASELINE BP: NORMAL MMHG
STRESS BASELINE HR: 77 BPM
STRESS O2 SAT REST: 97 %
STRESS PERCENT HR: 101 %
STRESS POST ESTIMATED WORKLOAD: 8.3 METS
STRESS POST EXERCISE DUR MIN: 7 MIN
STRESS POST EXERCISE DUR SEC: 51 SEC
STRESS POST O2 SAT PEAK: 95 %
STRESS POST PEAK BP: NORMAL MMHG
STRESS POST PEAK HR: 146 BPM
STRESS TARGET HR: 145 BPM

## 2024-05-01 ENCOUNTER — OFFICE VISIT (OUTPATIENT)
Dept: CARDIOLOGY | Facility: CLINIC | Age: 49
End: 2024-05-01
Payer: COMMERCIAL

## 2024-05-01 VITALS
SYSTOLIC BLOOD PRESSURE: 108 MMHG | WEIGHT: 277 LBS | HEIGHT: 70 IN | HEART RATE: 86 BPM | OXYGEN SATURATION: 96 % | BODY MASS INDEX: 39.65 KG/M2 | DIASTOLIC BLOOD PRESSURE: 68 MMHG

## 2024-05-01 DIAGNOSIS — I47.19 ATRIAL TACHYCARDIA: Primary | ICD-10-CM

## 2024-05-01 DIAGNOSIS — I10 ESSENTIAL HYPERTENSION: ICD-10-CM

## 2024-05-01 PROCEDURE — 99213 OFFICE O/P EST LOW 20 MIN: CPT | Performed by: PHYSICIAN ASSISTANT

## 2024-05-01 NOTE — PROGRESS NOTES
"[facility]        Ramesh Boston  1975  PCP: Randi Hong MD    SUBJECTIVE:   Ramesh Boston is a 48 y.o. male seen for a consultation visit regarding the following:     Chief Complaint:   Chief Complaint   Patient presents with    Essential hypertension              History:  48 year old male presents for follow-up regarding his tachycardia recent dyspnea exertion.  Successful stress test and echocardiogram revealed no ischemia.  He feels a lot of his symptoms probably are due to deconditioning which was noted on GXT stress test.  He is going to start dieting exercise he states.  He tells me his blood pressure is well-controlled.  He has had no palpitations.      Cardiac PMH: (Old records have been reviewed and summarized below)  Atrial tachycardia  Diagnosed at age 11  Multiple EP studies reported ablation x 4 (database insufficiency (  EP study 1995 and a burden ablation AV node requiring permanent pacemaker plan with return to intrinsic rhythm a few days later recurrent A. tach 2000  Pacemaker end-of-life, patient not requiring further pacing therefore generator change deferred, declined  Hypertension: Controlled on Zestril  Mild obesity  Obstructive sleep apnea, CPAP  Depression/anxiety, Paxil therapy  GERD, Prilosec  Erectile dysfunction, Cialis      Past Medical History, Past Surgical History, Family history, Social History, and Medications were all reviewed with the patient today and updated as necessary.     Current Outpatient Medications   Medication Sig Dispense Refill    B-D HYPODERMIC NEEDLE 22GX1\" 22G X 1\" misc       BD Hypodermic Needle 18G X 1\" misc       BD Plastipak Syringe 3 ML misc See Admin Instructions.      Cetirizine HCl (ZYRTEC PO) Take 1 tablet by mouth Daily.      CIALIS 20 MG tablet Take 5 mg by mouth Daily.  5    Dupilumab (Dupixent) 200 MG/1.14ML solution prefilled syringe 1.14 mL Every 14 (Fourteen) Days.      famotidine (PEPCID) 20 MG tablet Take " "1 tablet by mouth Daily.      lisinopril (PRINIVIL,ZESTRIL) 10 MG tablet TAKE 1 TABLET BY MOUTH DAILY AS DIRECTED.  1    Montelukast Sodium (SINGULAIR PO) Take 1 tablet by mouth Daily.      multivitamin (THERAGRAN) tablet tablet Take 2 tablets by mouth Daily. 2 gummies daily      PARoxetine (PAXIL) 20 MG tablet Take 1 tablet by mouth Daily.      Testosterone Cypionate (DEPOTESTOTERONE CYPIONATE) 200 MG/ML injection Every 28 (Twenty-Eight) Days.      Unable to find Med Name: CPAP QHS       No current facility-administered medications for this visit.     Allergies   Allergen Reactions    Penicillins Anaphylaxis         Past Medical History:   Diagnosis Date    Abnormal heart rhythm     H/O cardiovascular stress test     H/O echocardiogram     Hypertension      Past Surgical History:   Procedure Laterality Date    ABLATION OF DYSRHYTHMIC FOCUS      EP STUDY      INSERT / REPLACE / REMOVE PACEMAKER       Family History   Problem Relation Age of Onset    No Known Problems Mother     No Known Problems Father     No Known Problems Sister      Social History     Tobacco Use    Smoking status: Never     Passive exposure: Past    Smokeless tobacco: Never   Substance Use Topics    Alcohol use: No            PHYSICAL EXAM:   /68 (BP Location: Right arm, Patient Position: Sitting)   Pulse 86   Ht 177.8 cm (70\")   Wt 126 kg (277 lb)   SpO2 96%   BMI 39.75 kg/m²      Wt Readings from Last 5 Encounters:   05/01/24 126 kg (277 lb)   04/08/24 122 kg (268 lb 15.4 oz)   04/08/24 122 kg (269 lb)   02/26/24 122 kg (269 lb)   06/15/23 122 kg (270 lb)     BP Readings from Last 5 Encounters:   05/01/24 108/68   04/08/24 111/71   04/08/24 115/83   02/26/24 148/84   06/15/23 118/62       General-Well Nourished, Well developed  Eyes - PERRLA  Neck- supple, No mass  CV- regular rate and rhythm, no MRG  Lung- clear bilaterally  Abd- soft, +BS  Musc/skel - Norm strength and range of motion  Skin- warm and dry  Neuro - Alert & Oriented " x 3, appropriate mood.    Patient's external notes were reviewed.  Independent interpretation of test performed by another physician in facility were reviewed.  Outside laboratory data was also reviewed.    Medical problems and test results were reviewed with the patient today.       Procedures    ASSESSMENT   1.  SVT: Status post remote ablation procedure.  No recurrent events.    2.  Hypertension: Controlled, Zestril therapy    3.  Remote pacemaker for intermittent heart block, pacemaker end-of-life patient elected not to replace generator with no recurrent heart block symptoms.    4.  Shortness of breath: Negative stress test normal echocardiogram.  Probably due to deconditioning .       PLAN  Table CV course continue focus on risk factor diet exercise weight loss maintain blood pressure control.      Cardiology/Electrophysiology  05/01/24  11:42 EDT  Electronically signed by SANDY Segura, 05/01/24, 4:46 PM EDT.

## 2024-05-21 ENCOUNTER — TELEPHONE (OUTPATIENT)
Dept: CARDIOLOGY | Facility: CLINIC | Age: 49
End: 2024-05-21
Payer: COMMERCIAL

## 2024-05-21 NOTE — TELEPHONE ENCOUNTER
"Pt called wanting to be seen due to having frequent PVC's.  PT states the PVCs started late Saturday night early Sunday morning and have continued up until today 5/21/24.  PT does experience some SOB, denies CP but states his \" head feels like its throbbing when he has these episodes\"    PT wears his CPAP nightly     Pt was added to schedule for Friday 5/24 because he is going on vacation next week and would like to be seen before he goes.      "

## 2024-05-21 NOTE — TELEPHONE ENCOUNTER
Caller: Ramesh Boston    Relationship to patient: Self    Best call back number: [phone]    Chief complaint: PT IS HAVING PVCS SINCE SUNDAY MORNING - IS STARTING TO GET A LITTLE WORRIED AND WOULD LEYDI SOMEONE TO CALL HIM OR TO COME INTO THE OFFICE.     Type of visit: FOLLOW UP    Requested date: ASAP       Additional notes: PT HAS ALREADY LEFT A VM

## 2024-05-24 ENCOUNTER — OFFICE VISIT (OUTPATIENT)
Dept: CARDIOLOGY | Facility: CLINIC | Age: 49
End: 2024-05-24
Payer: COMMERCIAL

## 2024-05-24 VITALS
HEIGHT: 70 IN | OXYGEN SATURATION: 98 % | BODY MASS INDEX: 39.08 KG/M2 | DIASTOLIC BLOOD PRESSURE: 70 MMHG | WEIGHT: 273 LBS | SYSTOLIC BLOOD PRESSURE: 112 MMHG | HEART RATE: 76 BPM

## 2024-05-24 DIAGNOSIS — I10 ESSENTIAL HYPERTENSION: ICD-10-CM

## 2024-05-24 DIAGNOSIS — I49.3 PVC'S (PREMATURE VENTRICULAR CONTRACTIONS): ICD-10-CM

## 2024-05-24 DIAGNOSIS — R07.89 CHEST PAIN, ATYPICAL: Primary | ICD-10-CM

## 2024-05-24 PROCEDURE — 99214 OFFICE O/P EST MOD 30 MIN: CPT | Performed by: PHYSICIAN ASSISTANT

## 2024-05-24 PROCEDURE — 93000 ELECTROCARDIOGRAM COMPLETE: CPT | Performed by: PHYSICIAN ASSISTANT

## 2024-05-24 NOTE — PROGRESS NOTES
"[facility] at [facility]        Ramesh Boston  1975  PCP: Randi Hong MD    SUBJECTIVE:   Ramesh Boston is a 48 y.o. male seen for a consultation visit regarding the following:     Chief Complaint:   Chief Complaint   Patient presents with    Atrial tachycardia    pvc              History:  48 year old male presents for follow-up after recent episode of tachypalpitations that occurred while he was on a business trip in [facility].  He states he was down [place] suddenly and felt his heartbeat skip several times in a row this went on for 2 days in a row.  He did not say was actually rapid it was more of just a skipping sensation.  He thought possible they are PACs or PVCs.  This was concerning for him therefore he made a follow-up today.  He recently has had a stress test and echocardiogram that were normal.  He denies any syncope events with this.  He states his blood pressure is controlled.  He denies any heart failure symptoms like orthopnea PND or peripheral edema.    Cardiac PMH: (Old records have been reviewed and summarized below)  Atrial tachycardia  Diagnosed at age 11  Multiple EP studies reported ablation x 4 (database insufficiency)  EP study 1995 and a burden ablation AV node requiring permanent pacemaker plan with return to intrinsic rhythm a few days later recurrent A. tach 2000  Pacemaker end-of-life, patient not requiring further pacing therefore generator change deferred, declined  Hypertension: Controlled on Zestril  Mild obesity  Obstructive sleep apnea, CPAP  Depression/anxiety, Paxil therapy  GERD, Prilosec  Erectile dysfunction, Cialis  Testosterone with [facility]       Past Medical History, Past Surgical History, Family history, Social History, and Medications were all reviewed with the patient today and updated as necessary.     Current Outpatient Medications   Medication Sig Dispense Refill    B-D HYPODERMIC NEEDLE 22GX1\" 22G X 1\" misc       BD Hypodermic Needle " "18G X 1\" misc       BD Plastipak Syringe 3 ML misc See Admin Instructions.      Cetirizine HCl (ZYRTEC PO) Take 1 tablet by mouth Daily.      CIALIS 20 MG tablet Take 5 mg by mouth Daily.  5    Dupilumab (Dupixent) 200 MG/1.14ML solution prefilled syringe 1.14 mL Every 14 (Fourteen) Days.      famotidine (PEPCID) 20 MG tablet Take 1 tablet by mouth Daily.      lisinopril (PRINIVIL,ZESTRIL) 10 MG tablet TAKE 1 TABLET BY MOUTH DAILY AS DIRECTED.  1    Montelukast Sodium (SINGULAIR PO) Take 1 tablet by mouth Daily.      multivitamin (THERAGRAN) tablet tablet Take 2 tablets by mouth Daily. 2 gummies daily      PARoxetine (PAXIL) 20 MG tablet Take 1 tablet by mouth Daily.      Testosterone Cypionate (DEPOTESTOTERONE CYPIONATE) 200 MG/ML injection Every 28 (Twenty-Eight) Days.      Unable to find Med Name: CPAP QHS       No current facility-administered medications for this visit.     Allergies   Allergen Reactions    Penicillins Anaphylaxis         Past Medical History:   Diagnosis Date    Abnormal heart rhythm     H/O cardiovascular stress test     H/O echocardiogram     Hypertension      Past Surgical History:   Procedure Laterality Date    ABLATION OF DYSRHYTHMIC FOCUS      EP STUDY      INSERT / REPLACE / REMOVE PACEMAKER       Family History   Problem Relation Age of Onset    No Known Problems Mother     No Known Problems Father     No Known Problems Sister      Social History     Tobacco Use    Smoking status: Never     Passive exposure: Past    Smokeless tobacco: Never   Substance Use Topics    Alcohol use: No            PHYSICAL EXAM:   /70 (BP Location: Left arm, Patient Position: Sitting, Cuff Size: Large Adult)   Pulse 76   Ht 177.8 cm (70\")   Wt 124 kg (273 lb)   SpO2 98%   BMI 39.17 kg/m²      Wt Readings from Last 5 Encounters:   05/24/24 124 kg (273 lb)   05/01/24 126 kg (277 lb)   04/08/24 122 kg (268 lb 15.4 oz)   04/08/24 122 kg (269 lb)   02/26/24 122 kg (269 lb)     BP Readings from Last 5 " Encounters:   05/24/24 112/70   05/01/24 108/68   04/08/24 111/71   04/08/24 115/83   02/26/24 148/84       General-Well Nourished, Well developed  Eyes - PERRLA  Neck- supple, No mass  CV- regular rate and rhythm, no MRG  Lung- clear bilaterally  Abd- soft, +BS  Musc/skel - Norm strength and range of motion  Skin- warm and dry  Neuro - Alert & Oriented x 3, appropriate mood.    Patient's external notes were reviewed.  Independent interpretation of test performed by another physician in facility were reviewed.  Outside laboratory data was also reviewed.    Medical problems and test results were reviewed with the patient today.         ECG 12 Lead    Date/Time: 5/24/2024 1:21 PM  Performed by: Moses Hayward PA    Authorized by: Moses Hayward PA  Comparison: compared with previous ECG from 2/26/2024  Similar to previous ECG  Rhythm: sinus rhythm  Rate: normal  Conduction: conduction normal  ST Segments: ST segments normal  QRS axis: normal    Clinical impression: normal ECG          ASSESSMENT   1.  SVT: Status post remote ablation procedure.      2.  Hypertension: Controlled, Zestril therapy    3.  Remote pacemaker for intermittent heart block, pacemaker end-of-life patient elected not to replace generator with no recurrent heart block symptoms.    4.  Shortness of breath: Negative stress test normal echocardiogram 2024  Probably due to deconditioning .     5. Palpitations.  Recent symptoms suggesting PVCs.    PLAN  Follow-up Zio monitor.  He was given Lopressor to take as needed for PVCs.  Return for follow-up as scheduled.    Cardiology/Electrophysiology  05/24/24  10:01 EDT  Electronically signed by SANDY Segura, 05/01/24, 4:46 PM EDT.

## 2025-05-01 NOTE — PROGRESS NOTES
"[facility] at [facility]        Ramesh Boston  1975  PCP: Randi Hong MD    SUBJECTIVE:   Ramesh Boston is a 49 y.o. male seen for a consultation visit regarding the following:     Chief Complaint:   Chief Complaint   Patient presents with    Chest pain, atypical              History:  48 year old male presents for follow-up of atrial tachycardia, PVCs and palpitations.  Since last seen by our office he is doing well.  He is working on losing weight and has lost over 30 pounds.  He is not having chest pain chest tightness heart failure symptoms dizziness near syncope or syncope.  He is in a good place right now feels like he is had no issues.     Cardiac PMH: (Old records have been reviewed and summarized below)  Atrial tachycardia  Diagnosed at age 11  Multiple EP studies reported ablation x 4 (database insufficiency)  EP study 1995 and a burden ablation AV node requiring permanent pacemaker plan with return to intrinsic rhythm a few days later recurrent A. tach 2000  Pacemaker end-of-life, patient not requiring further pacing therefore generator change deferred, declined  PVC's Rare by Monitor   Hypertension: Controlled on Zestril  Mild obesity  Obstructive sleep apnea, CPAP  Depression/anxiety, Paxil therapy  GERD, Prilosec  Erectile dysfunction, Cialis  Testosterone with [facility]       Past Medical History, Past Surgical History, Family history, Social History, and Medications were all reviewed with the patient today and updated as necessary.     Current Outpatient Medications   Medication Sig Dispense Refill    B-D HYPODERMIC NEEDLE 22GX1\" 22G X 1\" misc       BD Hypodermic Needle 18G X 1\" misc       BD Plastipak Syringe 3 ML misc See Admin Instructions.      Cetirizine HCl (ZYRTEC PO) Take 1 tablet by mouth Daily.      CIALIS 20 MG tablet Take 5 mg by mouth Daily.  5    Dupilumab (Dupixent) 200 MG/1.14ML solution prefilled syringe 1.14 mL Every 14 (Fourteen) Days.      " "famotidine (PEPCID) 20 MG tablet Take 1 tablet by mouth Daily.      lisinopril (PRINIVIL,ZESTRIL) 10 MG tablet TAKE 1 TABLET BY MOUTH DAILY AS DIRECTED.  1    metoprolol tartrate (LOPRESSOR) 25 MG tablet Take 1 tablet by mouth Continuous As Needed (palplitaitons). 90 tablet 2    Montelukast Sodium (SINGULAIR PO) Take 1 tablet by mouth Daily.      multivitamin (THERAGRAN) tablet tablet Take 2 tablets by mouth Daily. 2 gummies daily      PARoxetine (PAXIL) 20 MG tablet Take 1 tablet by mouth As Needed.      Testosterone Cypionate (DEPOTESTOTERONE CYPIONATE) 200 MG/ML injection Every 28 (Twenty-Eight) Days.      Unable to find Med Name: CPAP QHS       No current facility-administered medications for this visit.     Allergies   Allergen Reactions    Penicillins Anaphylaxis         Past Medical History:   Diagnosis Date    Abnormal heart rhythm     H/O cardiovascular stress test     H/O echocardiogram     Hypertension      Past Surgical History:   Procedure Laterality Date    ABLATION OF DYSRHYTHMIC FOCUS      EP STUDY      INSERT / REPLACE / REMOVE PACEMAKER       Family History   Problem Relation Age of Onset    No Known Problems Mother     No Known Problems Father     No Known Problems Sister      Social History     Tobacco Use    Smoking status: Never     Passive exposure: Past    Smokeless tobacco: Never   Substance Use Topics    Alcohol use: No            PHYSICAL EXAM:   /70 (BP Location: Right arm, Patient Position: Sitting, Cuff Size: Adult)   Pulse 80   Ht 177.8 cm (70\")   Wt 114 kg (252 lb)   SpO2 99%   BMI 36.16 kg/m²      Wt Readings from Last 5 Encounters:   05/02/25 114 kg (252 lb)   05/24/24 124 kg (273 lb)   05/01/24 126 kg (277 lb)   04/08/24 122 kg (268 lb 15.4 oz)   04/08/24 122 kg (269 lb)     BP Readings from Last 5 Encounters:   05/02/25 118/70   05/24/24 112/70   05/01/24 108/68   04/08/24 111/71   04/08/24 115/83       General-Well Nourished, Well developed  Eyes - PERRLA  Neck- " supple, No mass  CV- regular rate and rhythm, no MRG  Lung- clear bilaterally  Abd- soft, +BS  Musc/skel - Norm strength and range of motion  Skin- warm and dry  Neuro - Alert & Oriented x 3, appropriate mood.    Patient's external notes were reviewed.  Independent interpretation of test performed by another physician in facility were reviewed.  Outside laboratory data was also reviewed.    Medical problems and test results were reviewed with the patient today.       Procedures  EG sinus rhythm no PVCs.  ASSESSMENT   1.  SVT: Status post remote ablation procedure.      2.  Hypertension: Controlled, Zestril therapy    3.  Remote pacemaker for intermittent heart block, pacemaker end-of-life patient elected not to replace generator with no recurrent heart block symptoms.    4.  Shortness of breath: Negative stress test normal echocardiogram 2024  Probably due to deconditioning .     5. Palpitations.  Recent symptoms suggesting PVCs. Negative Monitor 6/2024, rare PVC, NO SVT.     PLAN  Return for follow-up in 1 year    Cardiology/Electrophysiology  05/02/25  13:15 EDT  Electronically signed by SANDY Segura, 05/01/24, 4:46 PM EDT.

## 2025-05-02 ENCOUNTER — OFFICE VISIT (OUTPATIENT)
Dept: CARDIOLOGY | Facility: CLINIC | Age: 50
End: 2025-05-02
Payer: COMMERCIAL

## 2025-05-02 VITALS
HEIGHT: 70 IN | WEIGHT: 252 LBS | SYSTOLIC BLOOD PRESSURE: 118 MMHG | HEART RATE: 80 BPM | DIASTOLIC BLOOD PRESSURE: 70 MMHG | OXYGEN SATURATION: 99 % | BODY MASS INDEX: 36.08 KG/M2

## 2025-05-02 DIAGNOSIS — I49.3 PVC'S (PREMATURE VENTRICULAR CONTRACTIONS): ICD-10-CM

## 2025-05-02 DIAGNOSIS — I47.19 ATRIAL TACHYCARDIA: Primary | ICD-10-CM

## 2025-05-02 DIAGNOSIS — I10 ESSENTIAL HYPERTENSION: ICD-10-CM

## 2025-05-02 PROCEDURE — 99214 OFFICE O/P EST MOD 30 MIN: CPT | Performed by: PHYSICIAN ASSISTANT
